# Patient Record
Sex: FEMALE | Race: WHITE | Employment: UNEMPLOYED | URBAN - METROPOLITAN AREA
[De-identification: names, ages, dates, MRNs, and addresses within clinical notes are randomized per-mention and may not be internally consistent; named-entity substitution may affect disease eponyms.]

---

## 2017-02-27 ENCOUNTER — OFFICE VISIT (OUTPATIENT)
Dept: FAMILY MEDICINE CLINIC | Facility: CLINIC | Age: 37
End: 2017-02-27

## 2017-02-27 VITALS
HEART RATE: 84 BPM | HEIGHT: 64.5 IN | TEMPERATURE: 98 F | BODY MASS INDEX: 37.1 KG/M2 | DIASTOLIC BLOOD PRESSURE: 78 MMHG | OXYGEN SATURATION: 98 % | RESPIRATION RATE: 16 BRPM | SYSTOLIC BLOOD PRESSURE: 108 MMHG | WEIGHT: 220 LBS

## 2017-02-27 DIAGNOSIS — Z13.0 SCREENING FOR ENDOCRINE, NUTRITIONAL, METABOLIC AND IMMUNITY DISORDER: Primary | ICD-10-CM

## 2017-02-27 DIAGNOSIS — Z00.00 ENCOUNTER FOR ANNUAL PHYSICAL EXAM: ICD-10-CM

## 2017-02-27 DIAGNOSIS — Z13.29 SCREENING FOR ENDOCRINE, NUTRITIONAL, METABOLIC AND IMMUNITY DISORDER: Primary | ICD-10-CM

## 2017-02-27 DIAGNOSIS — Z12.4 CERVICAL CANCER SCREENING: ICD-10-CM

## 2017-02-27 DIAGNOSIS — Z13.21 SCREENING FOR ENDOCRINE, NUTRITIONAL, METABOLIC AND IMMUNITY DISORDER: Primary | ICD-10-CM

## 2017-02-27 DIAGNOSIS — Z13.228 SCREENING FOR ENDOCRINE, NUTRITIONAL, METABOLIC AND IMMUNITY DISORDER: Primary | ICD-10-CM

## 2017-02-27 DIAGNOSIS — S63.591A TFCC (TRIANGULAR FIBROCARTILAGE COMPLEX) TEAR, RIGHT, INITIAL ENCOUNTER: ICD-10-CM

## 2017-02-27 DIAGNOSIS — F41.9 ANXIETY DISORDER, UNSPECIFIED TYPE: ICD-10-CM

## 2017-02-27 DIAGNOSIS — Z11.3 SCREEN FOR STD (SEXUALLY TRANSMITTED DISEASE): ICD-10-CM

## 2017-02-27 PROCEDURE — 87624 HPV HI-RISK TYP POOLED RSLT: CPT | Performed by: EMERGENCY MEDICINE

## 2017-02-27 PROCEDURE — 87625 HPV TYPES 16 & 18 ONLY: CPT | Performed by: EMERGENCY MEDICINE

## 2017-02-27 PROCEDURE — 87591 N.GONORRHOEAE DNA AMP PROB: CPT | Performed by: EMERGENCY MEDICINE

## 2017-02-27 PROCEDURE — 88175 CYTOPATH C/V AUTO FLUID REDO: CPT | Performed by: EMERGENCY MEDICINE

## 2017-02-27 PROCEDURE — 87491 CHLMYD TRACH DNA AMP PROBE: CPT | Performed by: EMERGENCY MEDICINE

## 2017-02-27 PROCEDURE — 99395 PREV VISIT EST AGE 18-39: CPT | Performed by: EMERGENCY MEDICINE

## 2017-02-27 RX ORDER — QUETIAPINE 100 MG/1
1 TABLET, FILM COATED ORAL
COMMUNITY
Start: 2017-02-07

## 2017-02-27 RX ORDER — PHENDIMETRAZINE TARTRATE 105 MG/1
1 CAPSULE, EXTENDED RELEASE ORAL DAILY
COMMUNITY
End: 2017-08-23 | Stop reason: ALTCHOICE

## 2017-02-27 RX ORDER — GABAPENTIN 300 MG/1
1 CAPSULE ORAL DAILY
COMMUNITY
Start: 2017-02-07 | End: 2017-07-25

## 2017-02-27 NOTE — PATIENT INSTRUCTIONS
Thank you for choosing [address] [place] [address] Group  To Do:  FOR NISREEN TRIVEDI  1. Follow up in 1 month  2. Follow up with Dr Kirk Mcfarlane regarding wrist injury  3. Continue with all medications for now  4.        Treating Anxiety Disorders with Medication  An a muscular control, sedation, coma or death. Also, use only the amount of medication prescribed for you. If you think you may have taken too much, get emergency care right away.    · Antidepressant medication: This kind of medication is often used to treat an the problem. If you have a sexual side effect that concerns you, tell your health care provider. · Addiction: Antidepressants are not addictive.  And if Kadeem Valdes never had a problem with drugs or alcohol, you likely won’t have a problem with anti-anxiety med danger. · Phobias: These are extreme fears of certain objects, places, or events. · Obsessive-compulsive disorder: This causes you to have unwanted thoughts. You also may perform certain actions over and over. · Posttraumatic stress disorder:  This occur

## 2017-02-27 NOTE — PROGRESS NOTES
Lina Jara is a 29year old female who presents for a complete physical exam.   HPI:     Patient presents with:  Establish Care: Pt here for physical and pap.         Age:  29    1First day of last menstrual period (or first year of         me Have you had any of the following:                                        Height loss  NO                                       Broken hip or wrist    YES                                        Bone-density test YES normal 2016                    Do you ta : 210 lb     BP Readings from Last 3 Encounters:  02/27/17 : 108/78  12/30/16 : 120/70  11/30/16 : 148/85    Patient's last menstrual period was 02/02/2017 (approximate).              Current Outpatient Prescriptions:  QUEtiapine Fumarate 100 MG Oral Tab Ta MG Oral Tab Take 10 mg by mouth daily. Disp:  Rfl:    TraMADol HCl 50 MG Oral Tab Take 50 mg by mouth every 6 (six) hours as needed for Pain. Disp:  Rfl:    diazepam (VALIUM) 5 MG Oral Tab Take 5 mg by mouth every 8 (eight) hours as needed for Anxiety.  Dis food or seasonal allergies  PSYCH: no symptoms of depression or anxiety, depression screening negative.       EXAM:   /78 mmHg  Pulse 84  Temp(Src) 98.4 °F (36.9 °C) (Oral)  Resp 16  Ht 64.5\"  Wt 220 lb  BMI 37.19 kg/m2  SpO2 98%  LMP 02/02/2017 ([facility] balanced diet recommended.    Routine exercise recommended most days during the week. Wear sunscreen - SPF 15 or higher and reapply every 2 hours as needed. Wear seat belts and drive safely.   Schedule regular appointments with dentist.  Schedule yearly e

## 2017-02-28 LAB
C TRACH DNA SPEC QL NAA+PROBE: NEGATIVE
N GONORRHOEA DNA SPEC QL NAA+PROBE: NEGATIVE

## 2017-03-02 LAB — HPV I/H RISK 1 DNA SPEC QL NAA+PROBE: POSITIVE

## 2017-03-03 ENCOUNTER — LAB ENCOUNTER (OUTPATIENT)
Dept: LAB | Age: 37
End: 2017-03-03
Attending: EMERGENCY MEDICINE
Payer: MEDICAID

## 2017-03-03 DIAGNOSIS — Z13.228 SCREENING FOR ENDOCRINE, NUTRITIONAL, METABOLIC AND IMMUNITY DISORDER: ICD-10-CM

## 2017-03-03 DIAGNOSIS — Z13.0 SCREENING FOR ENDOCRINE, NUTRITIONAL, METABOLIC AND IMMUNITY DISORDER: ICD-10-CM

## 2017-03-03 DIAGNOSIS — Z13.21 SCREENING FOR ENDOCRINE, NUTRITIONAL, METABOLIC AND IMMUNITY DISORDER: ICD-10-CM

## 2017-03-03 DIAGNOSIS — Z13.29 SCREENING FOR ENDOCRINE, NUTRITIONAL, METABOLIC AND IMMUNITY DISORDER: ICD-10-CM

## 2017-03-03 LAB
25-HYDROXYVITAMIN D (TOTAL): 27.9 NG/ML (ref 30–100)
ALBUMIN SERPL-MCNC: 3.8 G/DL (ref 3.5–4.8)
ALP LIVER SERPL-CCNC: 64 U/L (ref 37–98)
ALT SERPL-CCNC: 22 U/L (ref 14–54)
AST SERPL-CCNC: 20 U/L (ref 15–41)
BASOPHILS # BLD AUTO: 0.05 X10(3) UL (ref 0–0.1)
BASOPHILS NFR BLD AUTO: 0.8 %
BILIRUB SERPL-MCNC: 0.5 MG/DL (ref 0.1–2)
BUN BLD-MCNC: 12 MG/DL (ref 8–20)
CALCIUM BLD-MCNC: 9.3 MG/DL (ref 8.3–10.3)
CHLORIDE: 104 MMOL/L (ref 101–111)
CHOLEST SMN-MCNC: 294 MG/DL (ref ?–200)
CO2: 29 MMOL/L (ref 22–32)
CREAT BLD-MCNC: 1.02 MG/DL (ref 0.55–1.02)
EOSINOPHIL # BLD AUTO: 0.36 X10(3) UL (ref 0–0.3)
EOSINOPHIL NFR BLD AUTO: 5.9 %
ERYTHROCYTE [DISTWIDTH] IN BLOOD BY AUTOMATED COUNT: 14 % (ref 11.5–16)
GLUCOSE BLD-MCNC: 93 MG/DL (ref 70–99)
HCT VFR BLD AUTO: 45.6 % (ref 34–50)
HDLC SERPL-MCNC: 107 MG/DL (ref 45–?)
HDLC SERPL: 2.75 {RATIO} (ref ?–4.44)
HGB BLD-MCNC: 15.2 G/DL (ref 12–16)
IMMATURE GRANULOCYTE COUNT: 0 X10(3) UL (ref 0–1)
IMMATURE GRANULOCYTE RATIO %: 0 %
LDLC SERPL CALC-MCNC: 156 MG/DL (ref ?–130)
LYMPHOCYTES # BLD AUTO: 3.44 X10(3) UL (ref 0.9–4)
LYMPHOCYTES NFR BLD AUTO: 56.5 %
M PROTEIN MFR SERPL ELPH: 7.8 G/DL (ref 6.1–8.3)
MCH RBC QN AUTO: 29.9 PG (ref 27–33.2)
MCHC RBC AUTO-ENTMCNC: 33.3 G/DL (ref 31–37)
MCV RBC AUTO: 89.6 FL (ref 81–100)
MONOCYTES # BLD AUTO: 0.36 X10(3) UL (ref 0.1–0.6)
MONOCYTES NFR BLD AUTO: 5.9 %
NEUTROPHIL ABS PRELIM: 1.88 X10 (3) UL (ref 1.3–6.7)
NEUTROPHILS # BLD AUTO: 1.88 X10(3) UL (ref 1.3–6.7)
NEUTROPHILS NFR BLD AUTO: 30.9 %
NONHDLC SERPL-MCNC: 187 MG/DL (ref ?–130)
PLATELET # BLD AUTO: 348 10(3)UL (ref 150–450)
POTASSIUM SERPL-SCNC: 3.8 MMOL/L (ref 3.6–5.1)
RBC # BLD AUTO: 5.09 X10(6)UL (ref 3.8–5.1)
RED CELL DISTRIBUTION WIDTH-SD: 46 FL (ref 35.1–46.3)
SODIUM SERPL-SCNC: 140 MMOL/L (ref 136–144)
TRIGLYCERIDES: 157 MG/DL (ref ?–150)
TSI SER-ACNC: 2.18 MIU/ML (ref 0.35–5.5)
VLDL: 31 MG/DL (ref 5–40)
WBC # BLD AUTO: 6.1 X10(3) UL (ref 4–13)

## 2017-03-03 PROCEDURE — 80061 LIPID PANEL: CPT

## 2017-03-03 PROCEDURE — 36415 COLL VENOUS BLD VENIPUNCTURE: CPT

## 2017-03-03 PROCEDURE — 80053 COMPREHEN METABOLIC PANEL: CPT

## 2017-03-03 PROCEDURE — 85025 COMPLETE CBC W/AUTO DIFF WBC: CPT

## 2017-03-03 PROCEDURE — 84443 ASSAY THYROID STIM HORMONE: CPT

## 2017-03-03 PROCEDURE — 82306 VITAMIN D 25 HYDROXY: CPT

## 2017-03-05 LAB
HPV16 DNA CVX QL PROBE+SIG AMP: NEGATIVE
HPV18 DNA CVX QL PROBE+SIG AMP: NEGATIVE

## 2017-03-07 ENCOUNTER — TELEPHONE (OUTPATIENT)
Dept: FAMILY MEDICINE CLINIC | Facility: CLINIC | Age: 37
End: 2017-03-07

## 2017-03-07 DIAGNOSIS — E78.00 HYPERCHOLESTEREMIA: ICD-10-CM

## 2017-03-07 DIAGNOSIS — E55.9 VITAMIN D DEFICIENCY: Primary | ICD-10-CM

## 2017-03-07 NOTE — TELEPHONE ENCOUNTER
----- Message from Bridget Foss MD sent at 3/7/2017 12:49 PM CST -----  High total cholesterol, LDL and Triglycerides. ADvise on low cholesterol low fat diet. Repeat Lipids in 6 months    Low Vit D. Needs to take OTC Vit D 2000 IU daily.  Repeat Vit D

## 2017-03-07 NOTE — TELEPHONE ENCOUNTER
Cuiker message sent:    \"Total cholesterol, LDL and Triglycerides are high. Work on a low cholesterol, low fat diet. Repeat Lipids in 6 months    Low Vitamin D. We would like you to take over the counter Vitamin D 2000iu daily.  We will repeat a Vitamin

## 2017-03-08 ENCOUNTER — TELEPHONE (OUTPATIENT)
Dept: FAMILY MEDICINE CLINIC | Facility: CLINIC | Age: 37
End: 2017-03-08

## 2017-03-08 NOTE — TELEPHONE ENCOUNTER
Left a detailed message and sent a my chart on results and information to follow up with GYNE>    Hold message until patient sees GYNE.

## 2017-03-08 NOTE — TELEPHONE ENCOUNTER
----- Message from William Haynes MD sent at 3/7/2017  9:57 PM CST -----  PAP normal  HPV +,   Has had abnormal PAPs in the past    pls refer to OB/Gyne  Dr. Eun Barrios or MD Dr. Danika Gonzalez  Obstetrics/Gynecology   [address] [facility]

## 2017-03-09 NOTE — TELEPHONE ENCOUNTER
Called and spoke with pt. Pt informed of lab results below and lab results from 3/3/17. Pt states understanding and agrees to plan.

## 2017-04-06 ENCOUNTER — TELEPHONE (OUTPATIENT)
Dept: FAMILY MEDICINE CLINIC | Facility: CLINIC | Age: 37
End: 2017-04-06

## 2017-04-17 ENCOUNTER — OFFICE VISIT (OUTPATIENT)
Dept: FAMILY MEDICINE CLINIC | Facility: CLINIC | Age: 37
End: 2017-04-17

## 2017-04-17 VITALS
SYSTOLIC BLOOD PRESSURE: 132 MMHG | WEIGHT: 226 LBS | DIASTOLIC BLOOD PRESSURE: 68 MMHG | HEART RATE: 98 BPM | RESPIRATION RATE: 16 BRPM | BODY MASS INDEX: 38.11 KG/M2 | HEIGHT: 64.5 IN

## 2017-04-17 DIAGNOSIS — N89.8 VAGINAL IRRITATION: Primary | ICD-10-CM

## 2017-04-17 DIAGNOSIS — E78.00 HYPERCHOLESTEROLEMIA: ICD-10-CM

## 2017-04-17 DIAGNOSIS — M25.531 CHRONIC WRIST PAIN, RIGHT: ICD-10-CM

## 2017-04-17 DIAGNOSIS — G89.29 CHRONIC WRIST PAIN, RIGHT: ICD-10-CM

## 2017-04-17 PROCEDURE — 87510 GARDNER VAG DNA DIR PROBE: CPT | Performed by: EMERGENCY MEDICINE

## 2017-04-17 PROCEDURE — 87591 N.GONORRHOEAE DNA AMP PROB: CPT | Performed by: EMERGENCY MEDICINE

## 2017-04-17 PROCEDURE — 87480 CANDIDA DNA DIR PROBE: CPT | Performed by: EMERGENCY MEDICINE

## 2017-04-17 PROCEDURE — 99214 OFFICE O/P EST MOD 30 MIN: CPT | Performed by: EMERGENCY MEDICINE

## 2017-04-17 PROCEDURE — 87491 CHLMYD TRACH DNA AMP PROBE: CPT | Performed by: EMERGENCY MEDICINE

## 2017-04-17 PROCEDURE — 87660 TRICHOMONAS VAGIN DIR PROBE: CPT | Performed by: EMERGENCY MEDICINE

## 2017-04-17 RX ORDER — FLUCONAZOLE 150 MG/1
150 TABLET ORAL ONCE
Qty: 1 TABLET | Refills: 1 | Status: SHIPPED | OUTPATIENT
Start: 2017-04-17 | End: 2017-04-17

## 2017-04-17 NOTE — PATIENT INSTRUCTIONS
Thank you for choosing [facility]  To Do:  FOR NISREEN TRIVEDI  1. Follow up with psychiatry for anxiety and depression  2. Follow up with Orthopedics for right wrist pain, continue to search for a provider with [facility]  3.  Recheck Chol tissues. A type of yeast called Candida albicans is often involved. · Trichomoniasis (“trich”). Johanna Staggers is a parasite. It is passed from one person to another during sex. Men with trich often don’t have any symptoms.  In women, it can take weeks or months be infection. Self-treatment may depend on whether:  · You've had a yeast infection in the past.  · You're at risk for STDs. Call your healthcare provider if symptoms do not go away or come back after treatment.    Date Last Reviewed: 5/12/2015  © 2006-6062 T

## 2017-04-17 NOTE — PROGRESS NOTES
Chief Complaint:   Patient presents with:  Lab Results    HPI:   This is a 29year old female     WRIST PAIN  Was following up with ORtho. Unable to follow up with ORtho due to insurance coverage.  Takes tramadol for pain      VAGINAL DISCHARGE  C/O vaginal MAY REPEAT IN 2 HOURS IF MIGRAINE PERSISTS. Disp: 10 tablet Rfl: 5     No current facility-administered medications on file prior to visit.     Allergies     Flaxseed                Anaphylaxis, Swelling  Flexeril [Cyclobenz*    Other (See Comments)    Comm encounter: 226 lb. Vital signs reviewed. Appears stated age, well groomed.   GENERAL: well developed, well nourished, well hydrated, no distress  SKIN: good skin turgor, no obvious rashes  HEENT: atraumatic, normocephalic, ears, nose and throat are clear Accident                                                               First Screen:          3 Cll Vandat Camryn                                                     Specimen:     ThinPrep Imager Screening Pap, Cervix 136-144 mmol/L   Potassium 3.8 3.6-5.1 mmol/L   Chloride 104 101-111 mmol/L   CO2 29.0 22.0-32.0 mmol/L   -LIPID PANEL   Collection Time: 03/03/17  2:49 PM   Result Value Ref Range   Cholesterol, Total 294 (H) <200 mg/dL   Triglycerides 157 (H) <150 mg/dL Negative   Neisseria Gonorrhoeae Amplified RNA Negative Negative   Chlam/GC Source Endocervix        ASSESSMENT AND PLAN:   Diagnoses and all orders for this visit:    Vaginal irritation  -     fluconazole (DIFLUCAN) 150 MG Oral Tab;  Take 1 tablet (150 mg

## 2017-04-19 PROBLEM — T74.91XA DOMESTIC VIOLENCE OF ADULT: Status: ACTIVE | Noted: 2017-03-10

## 2017-04-19 PROBLEM — F43.0 ACUTE STRESS DISORDER: Status: ACTIVE | Noted: 2017-03-10

## 2017-04-19 RX ORDER — HYDROCODONE BITARTRATE AND ACETAMINOPHEN 5; 325 MG/1; MG/1
1 TABLET ORAL
COMMUNITY
Start: 2016-06-16 | End: 2017-06-16

## 2017-04-19 RX ORDER — NAPROXEN 500 MG/1
500 TABLET ORAL
COMMUNITY
Start: 2016-04-21 | End: 2017-04-21

## 2017-04-21 ENCOUNTER — TELEPHONE (OUTPATIENT)
Dept: FAMILY MEDICINE CLINIC | Facility: CLINIC | Age: 37
End: 2017-04-21

## 2017-04-21 DIAGNOSIS — Z87.42 HISTORY OF ABNORMAL CERVICAL PAP SMEAR: ICD-10-CM

## 2017-04-21 DIAGNOSIS — B97.7 HIGH RISK HPV INFECTION: Primary | ICD-10-CM

## 2017-04-21 NOTE — TELEPHONE ENCOUNTER
----- Message from Renée Patterson MD sent at 4/20/2017 12:17 PM CDT -----  Neg for GC/Chlamydia  Continue with Diflucan for now   Follow up in 2 weeks if not better

## 2017-04-24 NOTE — TELEPHONE ENCOUNTER
Patient called back she did get the message but did not the vaginosis test result also she needs a referral faxed to Dr Sarah Hsieh with [facility] on [facility] phone number [phone] and fax number [phone]

## 2017-04-25 NOTE — TELEPHONE ENCOUNTER
[facility] Also need to verify that pt got this Dr name from [address] [place] [address].

## 2017-05-05 ENCOUNTER — TELEPHONE (OUTPATIENT)
Dept: FAMILY MEDICINE CLINIC | Facility: CLINIC | Age: 37
End: 2017-05-05

## 2017-05-08 NOTE — TELEPHONE ENCOUNTER
Pt called back and confirmed that the Dr Name below was given to her by Carli and wants the referral placed. Done. Pt aware it may take a few days to get approved. Pt aware of vaginitis panel and GC/Chlam results.

## 2017-05-08 NOTE — TELEPHONE ENCOUNTER
Pt is working on getting an appt with OB/GYN and ortho. Pt wants to know if you can refill a few things to get her by. Tri-sprintec  Imitrex  Tramadol 50mg  # 120 for wrist pain.   Needs surgery  Zanaflex 4mg TID for migraines    Pt states she underst

## 2017-05-08 NOTE — TELEPHONE ENCOUNTER
Екатерина Casillas RN at 5/8/2017  2:16 PM      Status: Signed :  Екатерина Casillas RN (Registered Nurse)     Expand All Collapse All    Pt called back and confirmed that the Dr Name below was given to her by [address] [place] [address] Adonis and wants the referral placed.

## 2017-05-09 RX ORDER — SUMATRIPTAN 100 MG/1
TABLET, FILM COATED ORAL
Qty: 10 TABLET | Refills: 0 | Status: SHIPPED | OUTPATIENT
Start: 2017-05-09 | End: 2017-07-25

## 2017-05-09 RX ORDER — NORGESTIMATE AND ETHINYL ESTRADIOL 7DAYSX3 28
KIT ORAL
Qty: 28 TABLET | Refills: 0 | Status: SHIPPED | OUTPATIENT
Start: 2017-05-09

## 2017-05-09 RX ORDER — TIZANIDINE 4 MG/1
4 TABLET ORAL 3 TIMES DAILY
Qty: 30 TABLET | Refills: 0 | Status: SHIPPED | OUTPATIENT
Start: 2017-05-09 | End: 2017-06-06

## 2017-05-09 RX ORDER — TRAMADOL HYDROCHLORIDE 50 MG/1
50 TABLET ORAL EVERY 6 HOURS PRN
Qty: 120 TABLET | Refills: 0 | Status: SHIPPED
Start: 2017-05-09 | End: 2017-06-06

## 2017-05-11 ENCOUNTER — MED REC SCAN ONLY (OUTPATIENT)
Dept: FAMILY MEDICINE CLINIC | Facility: CLINIC | Age: 37
End: 2017-05-11

## 2017-06-05 ENCOUNTER — TELEPHONE (OUTPATIENT)
Dept: FAMILY MEDICINE CLINIC | Facility: CLINIC | Age: 37
End: 2017-06-05

## 2017-06-05 DIAGNOSIS — B97.7 HIGH RISK HPV INFECTION: ICD-10-CM

## 2017-06-05 DIAGNOSIS — M25.531 CHRONIC WRIST PAIN, RIGHT: Primary | ICD-10-CM

## 2017-06-05 DIAGNOSIS — G89.29 CHRONIC WRIST PAIN, RIGHT: Primary | ICD-10-CM

## 2017-06-05 DIAGNOSIS — R87.619 ABNORMAL CERVICAL PAPANICOLAOU SMEAR, UNSPECIFIED ABNORMAL PAP FINDING: ICD-10-CM

## 2017-06-06 RX ORDER — TRAMADOL HYDROCHLORIDE 50 MG/1
50 TABLET ORAL EVERY 6 HOURS PRN
Qty: 120 TABLET | Refills: 0 | Status: SHIPPED
Start: 2017-06-06 | End: 2017-09-19

## 2017-06-06 RX ORDER — TIZANIDINE 4 MG/1
4 TABLET ORAL 3 TIMES DAILY
Qty: 30 TABLET | Refills: 0 | Status: SHIPPED | OUTPATIENT
Start: 2017-06-06 | End: 2017-07-25

## 2017-06-06 NOTE — TELEPHONE ENCOUNTER
See 4/21 encounter. OB referral faxed to Dr. Franny Danielson per pt request.  At [phone]. Confirmation received. Message left for pt to call office back to notify her of this & to ask if she needs Ortho referral still?

## 2017-06-06 NOTE — TELEPHONE ENCOUNTER
Pls inform patient that further refills should be from Orthopedic specialist since she is taking this for chronic wrist pain

## 2017-06-06 NOTE — TELEPHONE ENCOUNTER
Pt is requesting referrals for Dr. Pinon Huge  [facility], Ortho be placed & faxed to their offices. OB referral faxed to Dr. Hannah Walls at [phone]. Confirmation received. Ortho referral faxed to Dr. Migdalia Baxter at [phone]. Confirmation received.

## 2017-06-06 NOTE — TELEPHONE ENCOUNTER
Patient called office back. I informed patient that referral was sent to Sarah Roman.  Patient then stated that she would also like the same OB referral sent to another doctor as a secondary type thing because this new one is closer to her and she is not sure

## 2017-07-07 ENCOUNTER — TELEPHONE (OUTPATIENT)
Dept: FAMILY MEDICINE CLINIC | Facility: CLINIC | Age: 37
End: 2017-07-07

## 2017-07-07 NOTE — TELEPHONE ENCOUNTER
Spoke with Starr with [facility], regarding Tramadol hcl 50 mg tab rx. PA has been initiated and completed over the phone. Awaiting response via fax, approx 72hrs.

## 2017-07-25 ENCOUNTER — OFFICE VISIT (OUTPATIENT)
Dept: FAMILY MEDICINE CLINIC | Facility: CLINIC | Age: 37
End: 2017-07-25

## 2017-07-25 VITALS
DIASTOLIC BLOOD PRESSURE: 95 MMHG | RESPIRATION RATE: 16 BRPM | WEIGHT: 241 LBS | HEIGHT: 64.5 IN | TEMPERATURE: 99 F | BODY MASS INDEX: 40.65 KG/M2 | SYSTOLIC BLOOD PRESSURE: 142 MMHG | OXYGEN SATURATION: 97 % | HEART RATE: 120 BPM

## 2017-07-25 DIAGNOSIS — R03.0 BLOOD PRESSURE ELEVATED WITHOUT HISTORY OF HTN: ICD-10-CM

## 2017-07-25 DIAGNOSIS — F41.9 ANXIETY DISORDER, UNSPECIFIED TYPE: ICD-10-CM

## 2017-07-25 DIAGNOSIS — M79.601 RIGHT ARM PAIN: Primary | ICD-10-CM

## 2017-07-25 DIAGNOSIS — G43.909 MIGRAINE WITHOUT STATUS MIGRAINOSUS, NOT INTRACTABLE, UNSPECIFIED MIGRAINE TYPE: ICD-10-CM

## 2017-07-25 PROCEDURE — 99214 OFFICE O/P EST MOD 30 MIN: CPT | Performed by: EMERGENCY MEDICINE

## 2017-07-25 RX ORDER — SUMATRIPTAN 100 MG/1
TABLET, FILM COATED ORAL
Qty: 10 TABLET | Refills: 0 | Status: SHIPPED | OUTPATIENT
Start: 2017-07-25 | End: 2017-10-18

## 2017-07-25 RX ORDER — GABAPENTIN 300 MG/1
300 CAPSULE ORAL 2 TIMES DAILY
Qty: 60 CAPSULE | Refills: 2 | Status: SHIPPED | OUTPATIENT
Start: 2017-07-25 | End: 2017-08-23

## 2017-07-25 RX ORDER — TOPIRAMATE 100 MG/1
100 TABLET, FILM COATED ORAL
COMMUNITY
End: 2017-07-25 | Stop reason: ALTCHOICE

## 2017-07-25 RX ORDER — TRAMADOL HYDROCHLORIDE 50 MG/1
50 TABLET ORAL EVERY 6 HOURS PRN
Qty: 120 TABLET | Refills: 0 | Status: CANCELLED | OUTPATIENT
Start: 2017-07-25

## 2017-07-25 RX ORDER — ONDANSETRON 4 MG/1
TABLET, ORALLY DISINTEGRATING ORAL
COMMUNITY
Start: 2017-07-23

## 2017-07-25 RX ORDER — TIZANIDINE 4 MG/1
4 TABLET ORAL 3 TIMES DAILY
Qty: 30 TABLET | Refills: 0 | Status: SHIPPED | OUTPATIENT
Start: 2017-07-25 | End: 2017-11-22

## 2017-07-25 RX ORDER — HYDROCODONE BITARTRATE AND ACETAMINOPHEN 5; 325 MG/1; MG/1
1 TABLET ORAL EVERY 6 HOURS PRN
Qty: 15 TABLET | Refills: 0 | Status: SHIPPED | OUTPATIENT
Start: 2017-07-25 | End: 2017-08-23 | Stop reason: ALTCHOICE

## 2017-07-25 NOTE — PATIENT INSTRUCTIONS
Thank you for choosing [facility]  To Do:  FOR NISREEN TRIVEDI  1. Take norco for severe pain  2. Ice to the right arm  3. Increase Gabapentin to 300 mg twice a day  4. Follow up in 1 month for recheck  5. Regular BP checks  6.  Stop Phendi for a day or so. If this is the first time you experience these symptoms, you should immediately seek medical attention because you could be having a stroke. Is it a tension headache?   This type of headache is usually a dull ache or a sensation of pressur Chronic pain can also exist without a clear cause. Your perception of pain  Pain is a complex phenomenon that involves many of the chemicals found naturally in the spinal cord and brain. All pain signals travel to the brain.  The brain sends back signals t with family. Or visit a local attraction. Meditation  Meditation helps you focus on words, objects, or ideas. Doing this can calm you and decrease stress. Relaxation  Relaxation includes methods like listening to soothing music or relaxation tapes.  You m

## 2017-07-25 NOTE — PROGRESS NOTES
Chief Complaint:   Patient presents with: Follow - Up: f/u possible reaction to tetanus shot, foot pain, nausea/vomiting    HPI:   This is a 29year old female     ER FOLLOW UP  Seen in the ER for leg laceration [facility].  Wound repaired wi nightly. Disp:  Rfl:    escitalopram 10 MG Oral Tab Take 10 mg by mouth daily. Disp:  Rfl:    diazepam (VALIUM) 5 MG Oral Tab Take 5 mg by mouth every 8 (eight) hours as needed for Anxiety.  Disp:  Rfl:    [DISCONTINUED] TiZANidine HCl 4 MG Oral Tab Take 1 daily. Disp:  Rfl:    Phendimetrazine Tartrate  MG Oral Capsule SR 24 Hr Take 1 capsule by mouth daily. Disp:  Rfl:      No current facility-administered medications on file prior to visit.     Counseling given: Not Answered      REVIEW OF SYSTEMS: Oral Tab; Take 1 tablet by mouth every 6 (six) hours as needed for Pain. Blood pressure elevated without history of HTN   Will monitor. Currently taking a weight loss drug Phendimetrazine. Pt asked to D/C this drug for now.  Regular BP checks at home

## 2017-07-26 ENCOUNTER — MED REC SCAN ONLY (OUTPATIENT)
Dept: FAMILY MEDICINE CLINIC | Facility: CLINIC | Age: 37
End: 2017-07-26

## 2017-08-03 ENCOUNTER — TELEPHONE (OUTPATIENT)
Dept: FAMILY MEDICINE CLINIC | Facility: CLINIC | Age: 37
End: 2017-08-03

## 2017-08-03 NOTE — TELEPHONE ENCOUNTER
Pt called stating her Right shoulder pain is getting worse. The pain is moving up into her shoulder joint. She does have a Hx of Rotator Cuff repair in that shoulder. She is having trouble sleeping d/t pain. She took a Norco last night & that helped.   Sh

## 2017-08-04 NOTE — TELEPHONE ENCOUNTER
Called patient, no answer  LVMTCB  Patient may follow up here in clinic today. I have an opening.   May also follow up tomorrow, may double book  If not, she can go to ER

## 2017-08-04 NOTE — TELEPHONE ENCOUNTER
Message left on pt's VM per HIPPA that I will call her back as soon as orders are received from Dr. Amna Trejo.

## 2017-08-04 NOTE — TELEPHONE ENCOUNTER
Pt called back, she has no car and it is difficult for her to get here. Agreed to double book for tomorrow at 11am, she will call around to her friends to find a ride. Declined ER at this time.

## 2017-08-11 ENCOUNTER — TELEPHONE (OUTPATIENT)
Dept: FAMILY MEDICINE CLINIC | Facility: CLINIC | Age: 37
End: 2017-08-11

## 2017-08-11 NOTE — TELEPHONE ENCOUNTER
shoulder still hurts, pt went on  Tuesday 8-8 ER at [address] [place] [address] where she was told that pain may be due to tetnus vaccine she received recently, it's been 6 weeks since vaccine done at [address] [place] [address] she still has poor range

## 2017-08-11 NOTE — TELEPHONE ENCOUNTER
Please see below. Patient has her 1 month f/u appointment on 8/23. Would you like to see her sooner? Any orders or recommendations for continued right arm pain? Please advise. Thank you!

## 2017-08-16 NOTE — TELEPHONE ENCOUNTER
Called patient and left message per HIPAA advising of POC below. Advised patient to contact the office with any questions.

## 2017-08-16 NOTE — TELEPHONE ENCOUNTER
LOV 7/25/2017            LF 6/6/2017 # 120 with 0 refills      Please approve or deny Rx request.  Thank you!

## 2017-08-23 ENCOUNTER — OFFICE VISIT (OUTPATIENT)
Dept: FAMILY MEDICINE CLINIC | Facility: CLINIC | Age: 37
End: 2017-08-23

## 2017-08-23 ENCOUNTER — TELEPHONE (OUTPATIENT)
Dept: FAMILY MEDICINE CLINIC | Facility: CLINIC | Age: 37
End: 2017-08-23

## 2017-08-23 VITALS
OXYGEN SATURATION: 98 % | WEIGHT: 250 LBS | BODY MASS INDEX: 42.16 KG/M2 | DIASTOLIC BLOOD PRESSURE: 80 MMHG | SYSTOLIC BLOOD PRESSURE: 128 MMHG | TEMPERATURE: 98 F | HEIGHT: 64.5 IN | HEART RATE: 100 BPM | RESPIRATION RATE: 18 BRPM

## 2017-08-23 DIAGNOSIS — F41.9 ANXIETY DISORDER, UNSPECIFIED TYPE: ICD-10-CM

## 2017-08-23 DIAGNOSIS — M25.511 RIGHT SHOULDER PAIN, UNSPECIFIED CHRONICITY: Primary | ICD-10-CM

## 2017-08-23 PROCEDURE — 99214 OFFICE O/P EST MOD 30 MIN: CPT | Performed by: EMERGENCY MEDICINE

## 2017-08-23 RX ORDER — ONDANSETRON 4 MG/1
TABLET, ORALLY DISINTEGRATING ORAL
COMMUNITY
Start: 2017-07-23 | End: 2017-08-23

## 2017-08-23 RX ORDER — ONDANSETRON 4 MG/1
TABLET, ORALLY DISINTEGRATING ORAL EVERY 8 HOURS PRN
Qty: 20 TABLET | Refills: 0 | Status: SHIPPED | OUTPATIENT
Start: 2017-08-23 | End: 2017-10-15

## 2017-08-23 RX ORDER — GABAPENTIN 300 MG/1
300 CAPSULE ORAL 2 TIMES DAILY
Qty: 60 CAPSULE | Refills: 1 | Status: SHIPPED | OUTPATIENT
Start: 2017-08-23

## 2017-08-23 NOTE — PATIENT INSTRUCTIONS
Thank you for choosing [facility]  To Do:  FOR NISREEN TRIVEDI  1. Arrange for physical therapy  2. May contoinue with tramadol for pain  3. May also take OTC Ibuprofen for pain or Tylenol  4. Follow up with psychiatry  5.  Ff up with OB re told to take it on a regular schedule. Anti-anxiety medication may make you feel a little sleepy or “out of it.” Don’t drive a car or operate machinery while on this medication, until you know how it affects you.   Caution  Never use alcohol or other drugs effects: Medications may cause side effects. Ask your health care provider or pharmacist what you can expect. They may have ideas for avoiding some side effects.   · Sexual problems: Some antidepressants can affect your desire for sex or your ability to hav one-on-one, and often takes place for a set number of sessions. CBT has two main parts:  · Cognitive therapy helps you identify the negative, irrational thoughts that occur with your anxiety.  You’ll learn to replace these with more positive, realistic thou relief. They only make things worse in the long run. Date Last Reviewed: 1/19/2015  © 2343-2262 [facility] [address] [facility], [facility] [place]. All rights reserved.  This information is not intended as a substitute for professional m how it affects you. Caution  Never use alcohol or other drugs with anti-anxiety medications. This could result in loss of muscular control, sedation, coma or death. Also, use only the amount of medication prescribed for you.  If you think you may have take antidepressants can affect your desire for sex or your ability to have an orgasm. A change in dosage or medication often solves the problem. If you have a sexual side effect that concerns you, tell your health care provider.   · Addiction: Antidepressants a

## 2017-08-23 NOTE — TELEPHONE ENCOUNTER
I called [facility] on 8/23/2017 on her cell phone [phone]  To let her know we have release forms to be signed. Release forms will be in Dr. Silvia Fajardo pending folder.

## 2017-08-23 NOTE — PROGRESS NOTES
Chief Complaint:   Patient presents with: Follow - Up: 1 month FU, poss yeast infection, medication refills    HPI:   This is a 29year old female     FF UP RIGHT SHOULDER PAIN  Given Tdap on right arm approx 1 month ago.  Has had pain to the area since th Disp:  Rfl:    escitalopram 10 MG Oral Tab Take 10 mg by mouth daily. Disp:  Rfl:    diazepam (VALIUM) 5 MG Oral Tab Take 5 mg by mouth every 8 (eight) hours as needed for Anxiety.  Disp:  Rfl:    [DISCONTINUED] gabapentin 300 MG Oral Cap Take 1 capsule (30 rest of the ROS is negative except for those stated as above    PHYSICAL EXAM:   /80 (BP Location: Left arm, Patient Position: Sitting, Cuff Size: adult)   Pulse 100   Temp 98.4 °F (36.9 °C) (Oral)   Resp 18   Ht 64.5\"   Wt 250 lb   LMP 07/29/2017

## 2017-08-24 RX ORDER — TRAMADOL HYDROCHLORIDE 50 MG/1
50 TABLET ORAL EVERY 6 HOURS PRN
Qty: 120 TABLET | Refills: 0
Start: 2017-08-24

## 2017-09-19 ENCOUNTER — TELEPHONE (OUTPATIENT)
Dept: FAMILY MEDICINE CLINIC | Facility: CLINIC | Age: 37
End: 2017-09-19

## 2017-09-21 RX ORDER — TRAMADOL HYDROCHLORIDE 50 MG/1
50 TABLET ORAL EVERY 6 HOURS PRN
Qty: 90 TABLET | Refills: 0 | Status: SHIPPED
Start: 2017-09-21 | End: 2017-11-22

## 2017-09-21 RX ORDER — FLUCONAZOLE 150 MG/1
TABLET ORAL
Qty: 2 TABLET | Refills: 0 | Status: SHIPPED | OUTPATIENT
Start: 2017-09-21

## 2017-09-22 RX ORDER — ESCITALOPRAM OXALATE 10 MG/1
10 TABLET ORAL DAILY
Qty: 30 TABLET | Refills: 0 | Status: SHIPPED | OUTPATIENT
Start: 2017-09-22

## 2017-10-16 RX ORDER — ONDANSETRON 4 MG/1
TABLET, ORALLY DISINTEGRATING ORAL
Qty: 20 TABLET | Refills: 0 | Status: SHIPPED | OUTPATIENT
Start: 2017-10-16

## 2017-10-16 NOTE — TELEPHONE ENCOUNTER
Medication(s) to Refill:   Pending Prescriptions Disp Refills    ONDANSETRON 4 MG Oral Tablet Dispersible [Pharmacy Med Name: ONDANSETRON ODT 4MG TAB] 20 tablet 0     Sig: DISSOLVE ONE TO TWO TABLETS IN MOUTH EVERY 8 HOURS AS NEEDED FOR NAUSEA           [place]

## 2017-10-18 DIAGNOSIS — G43.909 MIGRAINE WITHOUT STATUS MIGRAINOSUS, NOT INTRACTABLE, UNSPECIFIED MIGRAINE TYPE: ICD-10-CM

## 2017-10-18 NOTE — TELEPHONE ENCOUNTER
Medication(s) to Refill:   Pending Prescriptions Disp Refills    SUMAtriptan Succinate 100 MG Oral Tab 10 tablet 0     Sig: TAKE ONE TABLET BY MOUTH AT ONSET OF MIGRAINE AND MAY REPEAT IN 2 HOURS IF MIGRAINE PERSISTS.            Last Time Medication was Alo

## 2017-10-19 RX ORDER — SUMATRIPTAN 100 MG/1
TABLET, FILM COATED ORAL
Qty: 10 TABLET | Refills: 0 | Status: SHIPPED | OUTPATIENT
Start: 2017-10-19 | End: 2017-11-22

## 2017-11-22 DIAGNOSIS — G43.909 MIGRAINE WITHOUT STATUS MIGRAINOSUS, NOT INTRACTABLE, UNSPECIFIED MIGRAINE TYPE: ICD-10-CM

## 2017-11-22 NOTE — TELEPHONE ENCOUNTER
Pt called stating that she has not had a chance to change her insurance and she plans on switching plans in the next two weeks.    Pt states that she has re-injured herself and is experiencing tooth and wrist pain and would like a refill of pending medicati

## 2017-11-28 RX ORDER — TIZANIDINE 4 MG/1
4 TABLET ORAL 3 TIMES DAILY
Qty: 30 TABLET | Refills: 0 | Status: SHIPPED | OUTPATIENT
Start: 2017-11-28 | End: 2017-12-15

## 2017-11-28 RX ORDER — TRAMADOL HYDROCHLORIDE 50 MG/1
50 TABLET ORAL EVERY 6 HOURS PRN
Qty: 90 TABLET | Refills: 0 | Status: SHIPPED
Start: 2017-11-28

## 2017-11-28 RX ORDER — SUMATRIPTAN 100 MG/1
TABLET, FILM COATED ORAL
Qty: 10 TABLET | Refills: 0 | Status: SHIPPED | OUTPATIENT
Start: 2017-11-28

## 2017-12-15 RX ORDER — TIZANIDINE 4 MG/1
4 TABLET ORAL 3 TIMES DAILY
Qty: 30 TABLET | Refills: 0 | Status: SHIPPED | OUTPATIENT
Start: 2017-12-15 | End: 2018-01-11

## 2017-12-15 NOTE — TELEPHONE ENCOUNTER
Medication(s) to Refill:   Pending Prescriptions Disp Refills    TiZANidine HCl 4 MG Oral Tab 30 tablet 0     Sig: Take 1 tablet (4 mg total) by mouth 3 (three) times daily.        pt currently has DEBI, per pt pharmacy pt will have [place] beginning Jan

## 2018-01-10 RX ORDER — TIZANIDINE 4 MG/1
TABLET ORAL
Qty: 30 TABLET | Refills: 0 | OUTPATIENT
Start: 2018-01-10

## 2018-01-11 RX ORDER — TIZANIDINE 4 MG/1
4 TABLET ORAL 3 TIMES DAILY
Qty: 30 TABLET | Refills: 0 | Status: SHIPPED | OUTPATIENT
Start: 2018-01-11 | End: 2018-04-05

## 2018-01-11 NOTE — TELEPHONE ENCOUNTER
Medication(s) to Refill:   Pending Prescriptions Disp Refills    TiZANidine HCl 4 MG Oral Tab 30 tablet 0     Sig: Take 1 tablet (4 mg total) by mouth 3 (three) times daily.            Last Time Medication was Filled:  12/15/2017    Last Office Visit with OSWALDO

## 2018-04-06 RX ORDER — TIZANIDINE 4 MG/1
TABLET ORAL
Qty: 30 TABLET | Refills: 0 | Status: SHIPPED | OUTPATIENT
Start: 2018-04-06 | End: 2018-04-13

## 2018-04-06 NOTE — TELEPHONE ENCOUNTER
Medication(s) to Refill:   Pending Prescriptions Disp Refills    TIZANIDINE HCL 4 MG Oral Tab [Pharmacy Med Name: TiZANidine 4MG      TAB] 30 tablet 0     Sig: TAKE ONE TABLET BY MOUTH THREE TIMES DAILY             Reason for Medication Refill being sent t

## 2018-04-14 NOTE — TELEPHONE ENCOUNTER
Medication(s) to Refill:   Pending Prescriptions Disp Refills    TIZANIDINE HCL 4 MG Oral Tab [Pharmacy Med Name: TiZANidine 4MG      TAB] 90 tablet 0     Sig: TAKE 1 TABLET BY MOUTH THREE TIMES DAILY             Reason for Medication Refill being sent to

## 2018-04-23 RX ORDER — SUMATRIPTAN 100 MG/1
TABLET, FILM COATED ORAL
Qty: 9 TABLET | Refills: 1 | Status: SHIPPED | OUTPATIENT
Start: 2018-04-23

## 2018-04-23 RX ORDER — TIZANIDINE 4 MG/1
TABLET ORAL
Qty: 90 TABLET | Refills: 0 | Status: SHIPPED | OUTPATIENT
Start: 2018-04-23

## 2018-04-23 NOTE — TELEPHONE ENCOUNTER
Medication(s) to Refill:   Pending Prescriptions Disp Refills    SUMATRIPTAN SUCCINATE 100 MG Oral Tab [Pharmacy Med Name: SUMATRIPTAN 100MG TAB] 9 tablet 1     Sig: TAKE ONE TABLET BY MOUTH AT THE ONSET OF MIGRAINE AND MAY REPEAT IN TWO HOURS IF MIGRAINE

## 2018-07-05 RX ORDER — SUMATRIPTAN 100 MG/1
TABLET, FILM COATED ORAL
Qty: 1 TABLET | Refills: 9 | Status: SHIPPED | OUTPATIENT
Start: 2018-07-05

## 2018-07-05 RX ORDER — SUMATRIPTAN 100 MG/1
TABLET, FILM COATED ORAL
Qty: 9 TABLET | Refills: 1 | OUTPATIENT
Start: 2018-07-05

## (undated) NOTE — LETTER
10/05/17        [place] [id number]      Dear Mel Ghosh,    [facility] records indicate that you have outstanding lab work and or testing that was ordered for you and has not yet been completed:              Vitamin D, 25-Hyd

## (undated) NOTE — MR AVS SNAPSHOT
[address]  [address]  [phone]  [phone]               Thank you for choosing us for your health care visit with Mansi Triana MD.  We are glad to serve you and happy to provide you with this summary of women find that normal discharge varies in amount and color through the month. An unhealthy environment  The vaginal environment may get out of balance. This may result in a vaginal infection. There are a few reasons this can happen.  The pH may have kamara · Severe vaginal itching or burning  · Burning with urination  · Swelling, redness of vulva  · Pain during sex  Treating yeast infection  Yeast infection is treated with a vaginal antifungal cream. In some cases, antifungal pills are prescribed instead.  Miguel A Take 1 tablet (150 mg total) by mouth once. Repeat in 1 week if not better   Commonly known as:  DIFLUCAN           gabapentin 300 MG Caps   Take 1 capsule by mouth daily.    Commonly known as:  NEURONTIN           Phendimetrazine Tartrate  MG Cp24

## (undated) NOTE — LETTER
10/10/17        [place] [address]      Dear Mel Aguirre,    [facility] records indicate that you have outstanding lab work and or testing that was ordered for you and has not yet been completed:          LIPID PANEL Haley Gomez [Q

## (undated) NOTE — MR AVS SNAPSHOT
[address]  [address] [phone] [phone]               Thank you for choosing us for your health care visit with Milvia Goldman MD.  We are glad to serve you and happy to provide you with this summary of [address] [place] [address]   Phone:  [phone]   Fax:  [phone]         Referral Orders      Normal Orders This Visit    ORTHOPEDIC - INTERNAL [[id number] CUSTOM]  Order #:  [id number]         **REFERRAL REQUEST**    Your physician has refe to suffer anymore. Treatment to help you overcome your fears will likely include therapy (also called counseling). Medication may also be prescribed to help control your symptoms.     Medications  Certain medications may be prescribed to help control your s you don’t notice results after the first few weeks, tell your provider.   Keep taking medications as prescribed  Never change your dosage or stop taking your medications without talking to your health care provider first. Keep the following in mind:  · Some substitute for professional medical care. Always follow your healthcare professional's instructions. Understanding Anxiety Disorders  Almost everyone gets nervous now and then.  It’s normal to have knots in your stomach before a test, or for your hea You may believe that nothing can help you. Or, you might fear what others may think. But most anxiety symptoms can be eased. Having an anxiety disorder is nothing to be ashamed of. Most people do best with treatment that combines medication and therapy.  Al Take 4 mg by mouth 3 (three) times daily. Commonly known as:  ZANAFLEX           TraMADol HCl 50 MG Tabs   Take 50 mg by mouth every 6 (six) hours as needed for Pain.    Commonly known as:  ULTRAM           TRI-SPRINTEC 0.18/0.215/0.25 MG-35 MCG Tabs   Ge are inactive.      HOW TO GET STARTED: HOW TO STAY MOTIVATED:   Start activities slowly and build up over time Do what you like   Get your heart pumping – brisk walking, biking, swimming Even 10 minute increments are effective and add up over the week   2 ½